# Patient Record
Sex: FEMALE | Race: WHITE | Employment: FULL TIME | URBAN - METROPOLITAN AREA
[De-identification: names, ages, dates, MRNs, and addresses within clinical notes are randomized per-mention and may not be internally consistent; named-entity substitution may affect disease eponyms.]

---

## 2019-05-23 ENCOUNTER — MEDICAL CORRESPONDENCE (OUTPATIENT)
Dept: HEALTH INFORMATION MANAGEMENT | Facility: CLINIC | Age: 39
End: 2019-05-23

## 2019-10-02 ENCOUNTER — DOCUMENTATION ONLY (OUTPATIENT)
Dept: CARE COORDINATION | Facility: CLINIC | Age: 39
End: 2019-10-02

## 2019-10-18 NOTE — TELEPHONE ENCOUNTER
RECORDS RECEIVED FROM: [facility] (scanned in [facility])    DATE RECEIVED: 11/7/19    NOTES STATUS DETAILS   OFFICE NOTE from referring provider Received/ CE [facility] recs scanned in [facility] 5/23/19    OFFICE NOTE from other specialist Received    DISCHARGE SUMMARY from hospital N/A    OPERATIVE REPORT N/A    MEDICATION LIST N/A         ENDOSCOPY  Care Everywhere 4/30/19, 7/21/13   COLONOSCOPY N/A    ERCP N/A    EUS N/A    STOOL TESTING N/A    PERTINENT LABS Internal/ CE     PATHOLOGY REPORTS (RELATED) Care Everywhere 4/30/19    IMAGING (CT, MRI, EGD) Received Fax to [facility] to have images pushed - 10/18    MRI 5.20.19  CT 4.11.19     REFERRAL INFORMATION    Date referral was placed: 11/7/19    Date all records received: N/A   Date records were scanned into Epic: N/A   Date records were sent to Provider to review: N/A   Date and recommendation received from provider:  LETTER SENT  SCHEDULE APPOINTMENT   Date patient was contacted to schedule: 6/6/19     Action 11.1.19 MJ 9:40 AM   Action Taken Pulled two images into PACS system.

## 2019-11-05 ENCOUNTER — TELEPHONE (OUTPATIENT)
Dept: GASTROENTEROLOGY | Facility: CLINIC | Age: 39
End: 2019-11-05

## 2019-11-07 ENCOUNTER — PRE VISIT (OUTPATIENT)
Dept: GASTROENTEROLOGY | Facility: CLINIC | Age: 39
End: 2019-11-07